# Patient Record
(demographics unavailable — no encounter records)

---

## 2025-04-25 NOTE — HISTORY OF PRESENT ILLNESS
[de-identified] : A 65 yrs old pt presents with recent onset of angioedema of her tongue with no associated urticaria She has had a total of 4 episodes since March 2025 involving different aspect of her tongue  She had several of these episodes while visiting her daughter in [place] She also reports having eaten fish on Good Friday and developing itchy mouth. Was seen by allergist in [facility] and has test results for my review  She was on Arbs which were DC but despite that thept CTNs to have episodic angioedema  No associated difficulty in breathing or swallowing No Hx of asthma   On note she has a cousin on her maternal side who also had a similar Hx and found allergic to ASA

## 2025-04-25 NOTE — SOCIAL HISTORY
[House] : [unfilled] lives in a house  [Radiator/Baseboard] : heating provided by radiator(s)/baseboard(s) [Dry] : dry [None] : none [Dust Mite Covers] : does not have dust mite covers [Feather Pillows] : does not have feather pillows [Feather Comforter] : does not have a feather comforter [Bedroom] : not in the bedroom [Basement] : not in the basement [Living Area] : not in the living area

## 2025-04-25 NOTE — PHYSICAL EXAM
[No Neck Mass] : no neck mass was observed [No LAD] : no lymphadenopathy [No Thyroid Mass] : no thyroid mass [Normal Rate and Effort] : normal respiratory rhythm and effort [Boggy Nasal Turbinates] : no boggy and/or pale nasal turbinates [Pharyngeal erythema] : no pharyngeal erythema [Posterior Pharyngeal Cobblestoning] : no posterior pharyngeal cobblestoning [Clear Rhinorrhea] : no clear rhinorrhea was seen [Exudate] : no exudate [Wheezing] : no wheezing was heard [Normal Rate] : heart rate was normal  [Normal S1, S2] : normal S1 and S2 [Regular Rhythm] : with a regular rhythm [Skin Intact] : skin intact  [No Induration] : no induration [Urticaria] : no urticaria [Dermatographism] : no dermatographism [de-identified] : No edema of the toung

## 2025-04-25 NOTE — ASSESSMENT
[FreeTextEntry1] : A 65 yrs old pt presents for further evaluation of angioedema  No clear cut triggers identified  Possibility of ASA as the agent is considered and pt advised to DC low dose ASA after discussing with her PCP The pt has normal levels of Tryptase ,Ci inhibitor and C1q( see scanned results)  Will get Immunocaps for common food allergens to R/O sensitization  RTC after labs  Pt advised to seek appropriate help if angioedema recurs

## 2025-05-23 NOTE — HISTORY OF PRESENT ILLNESS
[de-identified] : The pt returns for a follow up re " swelling " of her lips and tongue  On further questioning the pt states that she feels "bubbles" on the inside of her tongue but no swelling per se Similarly she feels irritation on the tip of her tongue with no swelling  No change in voice or difficulty in breathing Denies any involvement of the skin  The pt does state that her symptoms were related to exposure to seasoning mixture containing MSG The pt is off ASA and on PO prednisone 20 mgs BID as prescribed by her PCP

## 2025-05-23 NOTE — REVIEW OF SYSTEMS
[Fatigue] : no fatigue [Fever] : no fever [Wgt Loss (___ Lbs)] : no recent weight loss [Cough] : no cough [Wheezing Worsens With Exercise] : wheezing does not worsen with exercise [de-identified] : Pt states noticing "bubbles " on the inside of her lower lip

## 2025-05-23 NOTE — PHYSICAL EXAM
[Alert] : alert [Well Nourished] : well nourished [No Thrush] : no thrush [No Neck Mass] : no neck mass was observed [No LAD] : no lymphadenopathy [Normal Rate and Effort] : normal respiratory rhythm and effort [Bilateral Audible Breath Sounds] : bilateral audible breath sounds [Conjunctival Erythema] : no conjunctival erythema [Suborbital Bogginess] : no suborbital bogginess (allergic shiners) [Pale mucosa] : no pale mucosa [Boggy Nasal Turbinates] : no boggy and/or pale nasal turbinates [Pharyngeal erythema] : no pharyngeal erythema [Posterior Pharyngeal Cobblestoning] : no posterior pharyngeal cobblestoning [Exudate] : no exudate [Wheezing] : no wheezing was heard [de-identified] : The pt points to the mucosal aspect of her lower lip which on exam is normal.

## 2025-05-23 NOTE — ASSESSMENT
[FreeTextEntry1] : The pt presents for further evaluation of  1) Angioedema; There have been no further episodes of angioedema since initial visit  She is off low dose ASA,but Ctns to be on 20 mgs BiD of PO prednisone Will implement taper on PO prednisone and monitor for recurrence of symptoms  2) MSG re adverse effects; The pts current symptoms of "bubbling" sensation on hr tongue/lips could be re to MSG  Have counselled her to avoid SG containing products  Will F/U in 1 week Have spent greater than 30 min is explaining the various causations of her symptoms as well as ways to examine labels and avoidance